# Patient Record
Sex: FEMALE | Race: WHITE | URBAN - METROPOLITAN AREA
[De-identification: names, ages, dates, MRNs, and addresses within clinical notes are randomized per-mention and may not be internally consistent; named-entity substitution may affect disease eponyms.]

---

## 2017-07-31 VITALS
HEIGHT: 66 IN | HEART RATE: 68 BPM | SYSTOLIC BLOOD PRESSURE: 108 MMHG | WEIGHT: 164 LBS | BODY MASS INDEX: 26.36 KG/M2 | DIASTOLIC BLOOD PRESSURE: 76 MMHG

## 2017-08-25 ENCOUNTER — OFFICE VISIT (OUTPATIENT)
Dept: OBGYN | Facility: CLINIC | Age: 37
End: 2017-08-25
Payer: COMMERCIAL

## 2017-08-25 VITALS
HEIGHT: 66 IN | WEIGHT: 175 LBS | SYSTOLIC BLOOD PRESSURE: 112 MMHG | BODY MASS INDEX: 28.12 KG/M2 | DIASTOLIC BLOOD PRESSURE: 72 MMHG

## 2017-08-25 DIAGNOSIS — Z30.433 ENCOUNTER FOR IUD REMOVAL AND REINSERTION: Primary | ICD-10-CM

## 2017-08-25 PROCEDURE — 58300 INSERT INTRAUTERINE DEVICE: CPT | Performed by: OBSTETRICS & GYNECOLOGY

## 2017-08-25 PROCEDURE — 58301 REMOVE INTRAUTERINE DEVICE: CPT | Performed by: OBSTETRICS & GYNECOLOGY

## 2017-08-25 NOTE — NURSING NOTE
Please abstract the following data from this visit with this patient into the appropriate field in Epic:    Pap smear done on this date: 12/1/16, by this group: [facility], results were Negative.  (reviewed in Care Everywhere)

## 2017-08-25 NOTE — MR AVS SNAPSHOT
"              After Visit Summary   8/25/2017    Bronwyn Esteban    MRN: [id number]           Patient Information     Date Of Birth          1980        Visit Information        Provider Department      8/25/2017 10:50 AM Lisa Oscar MD [facility]        Today's Diagnoses     Encounter for IUD removal and reinsertion    -  1      Care Instructions    Mirena IUD removed and replaced today without issues.  Will return in 6 weeks for IUD check.  Will return for yearly exam in early December with me          Follow-ups after your visit        Follow-up notes from your care team     Return in about 6 weeks (around 10/6/2017) for IUD check.      Who to contact     If you have questions or need follow up information about today's clinic visit or your schedule please contact [facility] directly at [phone].  Normal or non-critical lab and imaging results will be communicated to you by Selerohart, letter or phone within 4 business days after the clinic has received the results. If you do not hear from us within 7 days, please contact the clinic through Selerohart or phone. If you have a critical or abnormal lab result, we will notify you by phone as soon as possible.  Submit refill requests through [employer] or call your pharmacy and they will forward the refill request to us. Please allow 3 business days for your refill to be completed.          Additional Information About Your Visit        MyChart Information     [employer] lets you send messages to your doctor, view your test results, renew your prescriptions, schedule appointments and more. To sign up, go to [email or web address] . Click on \"Log in\" on the left side of the screen, which will take you to the Welcome page. Then click on \"Sign up Now\" on the right side of the page.     You will be asked to enter the access code listed below, as well as some personal information. Please follow the directions to create your " "username and password.     Your access code is: [email or web address]  Expires: 2017 11:21 AM     Your access code will  in 90 days. If you need help or a new code, please call your [facility] or [phone].        Care EveryWhere ID     This is your Care EveryWhere ID. This could be used by other organizations to access your [facility] medical records  [id number]        Your Vitals Were     Height BMI (Body Mass Index)                5' 6.25\" (1.683 m) 28.03 kg/m2           Blood Pressure from Last 3 Encounters:   17 112/72   10/10/14 108/76   12 108/65    Weight from Last 3 Encounters:   17 175 lb (79.4 kg)   10/10/14 164 lb (74.4 kg)   12 182 lb (82.6 kg)              We Performed the Following     HC LEVONORGESTREL IU 52MG 5 YR     INSERTION INTRAUTERINE DEVICE     REMOVE INTRAUTERINE DEVICE          Today's Medication Changes          These changes are accurate as of: 17 11:21 AM.  If you have any questions, ask your nurse or doctor.               These medicines have changed or have updated prescriptions.        Dose/Directions    * levonorgestrel 20 MCG/24HR IUD   Commonly known as:  MIRENA   This may have changed:  Another medication with the same name was added. Make sure you understand how and when to take each.   Changed by:  Lisa Oscar MD        Dose:  1 each   1 each by Intrauterine route once   Refills:  0       * levonorgestrel 20 MCG/24HR IUD   Commonly known as:  MIRENA (52 MG)   This may have changed:  You were already taking a medication with the same name, and this prescription was added. Make sure you understand how and when to take each.   Used for:  Encounter for IUD removal and reinsertion   Changed by:  Lisa Oscar MD        Dose:  1 each   1 each (20 mcg) by Intrauterine route once for 1 dose Lot [id number], exp 2020   Quantity:  1 each   Refills:  0       * Notice:  This list has 2 medication(s) that are the same as other medications " prescribed for you. Read the directions carefully, and ask your doctor or other care provider to review them with you.         Where to get your medicines      Some of these will need a paper prescription and others can be bought over the counter.  Ask your nurse if you have questions.     You don't need a prescription for these medications     levonorgestrel 20 MCG/24HR IUD                Primary Care Provider    None , MD       No address on file        Equal Access to Services     [facility]: Hadii aad ku hadasho Soomaali, waaxda luqadaha, qaybta kaalmada adeegyada, waxay janein hayaan leny schradertrinayvette schwab . So [facility] [phone].    ATENCIÓN: Si habla gil, tiene a villa disposición servicios gratuitos de asistencia lingüística. Llame al [phone].    We comply with applicable federal civil rights laws and [place] laws. We do not discriminate on the basis of race, color, national origin, age, disability sex, sexual orientation or gender identity.            Thank you!     Thank you for choosing [facility]  for your care. Our goal is always to provide you with excellent care. Hearing back from our patients is one way we can continue to improve our services. Please take a few minutes to complete the written survey that you may receive in the mail after your visit with us. Thank you!             Your Updated Medication List - Protect others around you: Learn how to safely use, store and throw away your medicines at www.disposemymeds.org.          This list is accurate as of: 8/25/17 11:21 AM.  Always use your most recent med list.                   Brand Name Dispense Instructions for use Diagnosis    * levonorgestrel 20 MCG/24HR IUD    MIRENA     1 each by Intrauterine route once        * levonorgestrel 20 MCG/24HR IUD    MIRENA (52 MG)    1 each    1 each (20 mcg) by Intrauterine route once for 1 dose Lot [id number], exp 04/2020    Encounter for IUD removal and reinsertion        MULTIVITAMIN PO           * Notice:  This list has 2 medication(s) that are the same as other medications prescribed for you. Read the directions carefully, and ask your doctor or other care provider to review them with you.

## 2017-08-25 NOTE — Clinical Note
Please abstract the following data from this visit with this patient into the appropriate field in Epic:  Pap smear done on this date: 12/1/16, by this group: [facility], results were Negative.  (reviewed in Care Everywhere)

## 2017-08-25 NOTE — PATIENT INSTRUCTIONS
Mirena IUD removed and replaced today without issues.  Will return in 6 weeks for IUD check.  Will return for yearly exam in early December with me

## 2017-08-25 NOTE — PROGRESS NOTES
INDICATIONS:                                                      Bronwyn Esteban is a 37 year old female,, No LMP recorded. Patient is not currently having periods (Reason: IUD). who presents today for IUD removal and reinsertion. Her current IUD was placed 5 years ago, 12.. She has not had problems with the IUD. She requests removal of the IUD because the IUD effectiveness has     Is a pregnancy test required: No.  Was a consent obtained?  Yes      PROCEDURE:                                                      A speculum exam was performed and the cervix was visualized. The IUD string was not visualized. Using ring forceps, the string  was grasped and the IUD removed intact.    POST PROCEDURE:                                                      The patient tolerated the procedure well with minimal discomfort. Patient was discharged in stable condition.    Call if bleeding, pain or fever occur., Birth control counseling given. and Mirena IUD replaced today with issue    Lisa Oscar MD        INDICATIONS:                                                        Bronwyn Esteban is a 37 year old female,   who presents for removal and insertion of an IUD. The risks, benefits and alternatives of IUD insertion were discussed in detail previously. She also has reviewed the product brochure.  She has elected to go ahead with the insertion today and her questions were answered. No LMP recorded due to current.    Is a pregnancy test required: No.  Was a consent obtained?  Yes      PROCEDURE:                                                      The pelvic exam revealed normal external genitalia. On bimanual exam the uterus was Anteverted and Midposition and normal in size with no tenderness present. A speculum was inserted into the vagina and the cervix was visualized. The cervix was prepped with Betadine . The anterior lip of the cervix was grasped with a single toothed tenaculum. The uterus  sounded to 8 cm. A Mirena IUD was then inserted without difficulty. The string was cut to 3 cm.  The patient experienced a moderate amount of cramping.    POST PROCEDURE:                                                      She  tolerated the procedure well with minimal discomfort. There were no complications. Patient was discharged in stable condition.    Return to clinic in 6 weeks for IUD check.  Call if severe cramping, fever, abnormal bleeding, abnormal discharge or pelvic pain develop.  Patient was counseled about the chance of irregular bleeding.    Lisa Oscar MD

## 2017-10-03 ENCOUNTER — OFFICE VISIT (OUTPATIENT)
Dept: OBGYN | Facility: CLINIC | Age: 37
End: 2017-10-03
Payer: COMMERCIAL

## 2017-10-03 VITALS
HEIGHT: 66 IN | DIASTOLIC BLOOD PRESSURE: 80 MMHG | WEIGHT: 178 LBS | SYSTOLIC BLOOD PRESSURE: 116 MMHG | BODY MASS INDEX: 28.61 KG/M2

## 2017-10-03 DIAGNOSIS — Z30.431 IUD CHECK UP: Primary | ICD-10-CM

## 2017-10-03 PROCEDURE — 99213 OFFICE O/P EST LOW 20 MIN: CPT | Performed by: OBSTETRICS & GYNECOLOGY

## 2017-10-03 NOTE — PROGRESS NOTES
"    SUBJECTIVE:                                                   Bronwyn Esteban is a 37 year old female who presents to clinic today for the following health issue(s):  Patient presents with:  IUD: IUD follow up, no issues        HPI:  Here today for IUD check.  Had mirena IUD replaced with me uneventfully on  --has had no issues.  Had only 1-2d of light spotting.  Denies cramping or pain.  +SA --no issues for she or her     No LMP recorded. Patient is not currently having periods (Reason: IUD)..   Patient is sexually active, .  Using IUD for contraception.    reports that she has never smoked. She has never used smokeless tobacco.      STD testing offered?  Declined    Health maintenance updated:  yes    Today's PHQ-2 Score: No flowsheet data found.  Today's PHQ-9 Score: No flowsheet data found.  Today's ALEJANDRO-7 Score: No flowsheet data found.    Problem list and histories reviewed & adjusted, as indicated.  Additional history: as documented.    Patient Active Problem List   Diagnosis     IUD (intrauterine device) in place     Past Surgical History:   Procedure Laterality Date     HC TOOTH EXTRACTION W/FORCEP       right radial[      right radial head removed      Social History   Substance Use Topics     Smoking status: Never Smoker     Smokeless tobacco: Never Used     Alcohol use No      Problem (# of Occurrences) Relation (Name,Age of Onset)    DIABETES (1) Paternal Grandmother            Current Outpatient Prescriptions   Medication Sig     Multiple Vitamins-Minerals (MULTIVITAMIN PO)      levonorgestrel (MIRENA) 20 MCG/24HR IUD 1 each by Intrauterine route once     No current facility-administered medications for this visit.      No Known Allergies    ROS:  12 point review of systems negative other than symptoms noted below.    OBJECTIVE:     /80  Ht 5' 6.25\" (1.683 m)  Wt 178 lb (80.7 kg)  Breastfeeding? No  BMI 28.51 kg/m2  Body mass index is 28.51 " kg/(m^2).    Exam:  Constitutional:  Appearance: Well nourished, well developed alert, in no acute distress  Skin:General Inspection:  No rashes present, no lesions present, no areas of discoloration; Genitalia and Groin:  No rashes present, no lesions present, no areas of discoloration, no masses present.  Neurologic/Psychiatric:  Mental Status:  Oriented X3   Pelvic Exam:  External Genitalia:     Normal appearance for age, no discharge present, no tenderness present, no inflammatory lesions present, color normal  Vagina:    Normal vaginal vault without central or paravaginal defects, no discharge present, no inflammatory lesions present, no masses present  Bladder:     Nontender to palpation  Urethra:   Urethral Body:  Urethra palpation normal, urethra structural support normal   Urethral Meatus:  No erythema or lesions present  Cervix:     Appearance healthy, no lesions present, nontender to palpation, no bleeding present, string present; 2CM  Uterus:     Nontender to palpation, no masses present, position anteflexed, mobility: normal  Adnexa:     No adnexal tenderness present, no adnexal masses present  Perineum:     Perineum within normal limits, no evidence of trauma, no rashes or skin lesions present  Anus:     Anus within normal limits, no hemorrhoids present  Inguinal Lymph Nodes:     No lymphadenopathy present  Pubic Hair:     Normal pubic hair distribution for age  Genitalia and Groin:     No rashes present, no lesions present, no areas of discoloration, no masses present       In-Clinic Test Results:  No results found for this or any previous visit (from the past 24 hour(s)).    ASSESSMENT/PLAN:                                                        ICD-10-CM    1. IUD check up Z30.431        Patient Instructions   IUD strings seen easily today on exam.  Will follow up with future yearly exams      Lisa Oscar MD  [facility]

## 2017-10-03 NOTE — MR AVS SNAPSHOT
"              After Visit Summary   10/3/2017    Bronwyn Esteban    MRN: [id number]           Patient Information     Date Of Birth          1980        Visit Information        Provider Department      10/3/2017 9:40 AM Lisa Oscar MD [facility]        Today's Diagnoses     IUD check up    -  1      Care Instructions    IUD strings seen easily today on exam.  Will follow up with future yearly exams          Follow-ups after your visit        Follow-up notes from your care team     Return in about 1 year (around 10/3/2018) for Annual Exam.      Who to contact     If you have questions or need follow up information about today's clinic visit or your schedule please contact [facility] directly at [phone].  Normal or non-critical lab and imaging results will be communicated to you by [employer], letter or phone within 4 business days after the clinic has received the results. If you do not hear from us within 7 days, please contact the clinic through [employer] or phone. If you have a critical or abnormal lab result, we will notify you by phone as soon as possible.  Submit refill requests through [employer] or call your pharmacy and they will forward the refill request to us. Please allow 3 business days for your refill to be completed.          Additional Information About Your Visit        MyChart Information     [employer] lets you send messages to your doctor, view your test results, renew your prescriptions, schedule appointments and more. To sign up, go to [email or web address] . Click on \"Log in\" on the left side of the screen, which will take you to the Welcome page. Then click on \"Sign up Now\" on the right side of the page.     You will be asked to enter the access code listed below, as well as some personal information. Please follow the directions to create your username and password.     Your access code is: [email or web address]  Expires: 11/23/2017 11:21 AM     Your " "access code will  in 90 days. If you need help or a new code, please call your [place] clinic or [phone].        Care EveryWhere ID     This is your Care EveryWhere ID. This could be used by other organizations to access your [place] medical records  [id number]        Your Vitals Were     Height Breastfeeding? BMI (Body Mass Index)             5' 6.25\" (1.683 m) No 28.51 kg/m2          Blood Pressure from Last 3 Encounters:   10/03/17 116/80   17 112/72   10/10/14 108/76    Weight from Last 3 Encounters:   10/03/17 178 lb (80.7 kg)   17 175 lb (79.4 kg)   10/10/14 164 lb (74.4 kg)              Today, you had the following     No orders found for display       Primary Care Provider    None Specified       No primary provider on file.        Equal Access to Services     [facility]: Hadii alban De Jesus, wamarcosda amalia, qaybta kaalmada whit, margarita schwab . So [facility] [phone].    ATENCIÓN: Si habla español, tiene a villa disposición servicios gratuitos de asistencia lingüística. Llame al [phone].    We comply with applicable federal civil rights laws and [place] laws. We do not discriminate on the basis of race, color, national origin, age, disability, sex, sexual orientation, or gender identity.            Thank you!     Thank you for choosing [facility] [place]  for your care. Our goal is always to provide you with excellent care. Hearing back from our patients is one way we can continue to improve our services. Please take a few minutes to complete the written survey that you may receive in the mail after your visit with us. Thank you!             Your Updated Medication List - Protect others around you: Learn how to safely use, store and throw away your medicines at www.disposemymeds.org.          This list is accurate as of: 10/3/17 10:07 AM.  Always use your most recent med list.                   Brand Name Dispense " Instructions for use Diagnosis    levonorgestrel 20 MCG/24HR IUD    MIRENA     1 each by Intrauterine route once        MULTIVITAMIN PO